# Patient Record
Sex: MALE | Race: WHITE | NOT HISPANIC OR LATINO | URBAN - METROPOLITAN AREA
[De-identification: names, ages, dates, MRNs, and addresses within clinical notes are randomized per-mention and may not be internally consistent; named-entity substitution may affect disease eponyms.]

---

## 2017-02-09 PROBLEM — 428283002 HISTORY OF POLYP OF COLON (SITUATION): Status: ACTIVE | Noted: 2017-02-09

## 2020-10-01 ENCOUNTER — OFFICE VISIT (OUTPATIENT)
Dept: URBAN - METROPOLITAN AREA CLINIC 31 | Facility: CLINIC | Age: 60
End: 2020-10-01

## 2020-10-01 ENCOUNTER — DASHBOARD ENCOUNTERS (OUTPATIENT)
Age: 60
End: 2020-10-01

## 2020-10-01 VITALS — WEIGHT: 203 LBS | HEIGHT: 71 IN | BODY MASS INDEX: 28.42 KG/M2

## 2020-10-01 PROBLEM — 14760008: Status: ACTIVE | Noted: 2020-10-01

## 2020-10-01 PROBLEM — 429047008: Status: ACTIVE | Noted: 2020-10-01

## 2020-10-01 RX ORDER — RIVAROXABAN 2.5 MG/1
TABLET, FILM COATED ORAL TWICE A DAY
Status: ACTIVE | COMMUNITY

## 2020-10-01 RX ORDER — EMPAGLIFLOZIN 10 MG/1
0.5 TABLET, FILM COATED ORAL ONCE A DAY
Status: ACTIVE | COMMUNITY

## 2020-10-01 RX ORDER — METFORMIN HYDROCHLORIDE 500 MG/1
1 TABLET WITH MEALS TABLET, FILM COATED ORAL TWICE A DAY
Status: DISCONTINUED | COMMUNITY

## 2020-10-01 RX ORDER — ASPIRIN 81 MG/1
1 TABLET TABLET, CHEWABLE ORAL ONCE A DAY
Status: ACTIVE | COMMUNITY

## 2020-10-01 RX ORDER — ACETAMINOPHEN,PHENIRAMINE MALEATE, PHENYLEPHRINE HYDROCHLORIDE 650; 20; 10 MG/15000MG; MG/15000MG; MG/15000MG
AS DIRECTED POWDER, FOR SUSPENSION ORAL
Status: DISCONTINUED | COMMUNITY
Start: 2017-02-09

## 2020-10-01 RX ORDER — ENALAPRIL MALEATE 2.5 MG/1
1 TABLET TABLET ORAL ONCE A DAY
Status: ACTIVE | COMMUNITY

## 2020-10-01 RX ORDER — SODIUM PICOSULFATE, MAGNESIUM OXIDE, AND ANHYDROUS CITRIC ACID 10; 3.5; 12 MG/160ML; G/160ML; G/160ML
160 ML LIQUID ORAL
Qty: 1 KIT | Refills: 0 | OUTPATIENT
Start: 2020-10-01

## 2020-10-01 RX ORDER — TRAMADOL HYDROCHLORIDE 50 MG/1
1 TABLET AS NEEDED TABLET, FILM COATED ORAL
Qty: 20 | Refills: 0 | Status: DISCONTINUED | COMMUNITY
Start: 2016-11-11

## 2020-10-01 RX ORDER — SODIUM SULFATE, POTASSIUM SULFATE, MAGNESIUM SULFATE 17.5; 3.13; 1.6 G/ML; G/ML; G/ML
AS DIRECTED SOLUTION, CONCENTRATE ORAL
Qty: 1 | Refills: 0 | Status: DISCONTINUED | COMMUNITY
Start: 2017-02-09

## 2020-10-01 RX ORDER — L-METHYLFOLATE-ALGAE-VIT B12-B6 CAP 3-90.314-2-35 MG 3-90.314-2-35 MG
CAP ORAL BID
Status: ACTIVE | COMMUNITY

## 2020-10-01 RX ORDER — DULAGLUTIDE 1.5 MG/.5ML
0.5 ML INJECTION, SOLUTION SUBCUTANEOUS
Status: DISCONTINUED | COMMUNITY

## 2020-10-01 RX ORDER — CEPHALEXIN 500 MG
1 CAPSULE CAPSULE ORAL TWICE A DAY
Qty: 14 CAPSULE | Refills: 0 | Status: DISCONTINUED | COMMUNITY
Start: 2016-11-11

## 2020-10-01 RX ORDER — CLOPIDOGREL BISULFATE 75 MG/1
TAKE 1 TABLET BY MOUTH BY MOUTH DAILY TABLET, FILM COATED ORAL
Qty: 30 UNSPECIFIED | Refills: 1 | Status: DISCONTINUED | COMMUNITY

## 2020-10-01 NOTE — EXAM-MIGRATED EXAMINATIONS
GENERAL APPEARANCE: - alert, in no acute distress, well developed, well nourished;   ORAL CAVITY: - mucosa moist.  MP 4.;

## 2020-11-02 ENCOUNTER — TELEPHONE ENCOUNTER (OUTPATIENT)
Dept: URBAN - METROPOLITAN AREA CLINIC 35 | Facility: CLINIC | Age: 60
End: 2020-11-02

## 2020-11-02 RX ORDER — SODIUM PICOSULFATE, MAGNESIUM OXIDE, AND ANHYDROUS CITRIC ACID 10; 3.5; 12 MG/160ML; G/160ML; G/160ML
160 ML LIQUID ORAL
Qty: 1 KIT | Refills: 0 | OUTPATIENT
Start: 2020-10-01

## 2020-11-05 ENCOUNTER — TELEPHONE ENCOUNTER (OUTPATIENT)
Dept: URBAN - METROPOLITAN AREA CLINIC 35 | Facility: CLINIC | Age: 60
End: 2020-11-05

## 2020-11-06 ENCOUNTER — OFFICE VISIT (OUTPATIENT)
Dept: URBAN - METROPOLITAN AREA MEDICAL CENTER 10 | Facility: MEDICAL CENTER | Age: 60
End: 2020-11-06

## 2020-11-10 ENCOUNTER — TELEPHONE ENCOUNTER (OUTPATIENT)
Dept: URBAN - METROPOLITAN AREA CLINIC 35 | Facility: CLINIC | Age: 60
End: 2020-11-10

## 2020-11-12 ENCOUNTER — TELEPHONE ENCOUNTER (OUTPATIENT)
Dept: URBAN - METROPOLITAN AREA CLINIC 35 | Facility: CLINIC | Age: 60
End: 2020-11-12

## 2020-11-17 ENCOUNTER — OFFICE VISIT (OUTPATIENT)
Dept: URBAN - METROPOLITAN AREA CLINIC 31 | Facility: CLINIC | Age: 60
End: 2020-11-17

## 2021-01-06 ENCOUNTER — TELEPHONE ENCOUNTER (OUTPATIENT)
Dept: URBAN - METROPOLITAN AREA CLINIC 35 | Facility: CLINIC | Age: 61
End: 2021-01-06

## 2021-01-08 ENCOUNTER — OFFICE VISIT (OUTPATIENT)
Dept: URBAN - METROPOLITAN AREA MEDICAL CENTER 10 | Facility: MEDICAL CENTER | Age: 61
End: 2021-01-08

## 2021-01-19 ENCOUNTER — OFFICE VISIT (OUTPATIENT)
Dept: URBAN - METROPOLITAN AREA CLINIC 31 | Facility: CLINIC | Age: 61
End: 2021-01-19